# Patient Record
(demographics unavailable — no encounter records)

---

## 2020-03-17 NOTE — RAD
Examination:

1. Bilateral digital diagnostic mammogram.

2. Limited breast ultrasound.



INDICATION: 44-year-old woman with palpable lump in the left breast. She is

due for screening. Does not recall where her last screening mammogram was.



COMPARISON: None currently available.



TECHNIQUE: CC and MLO views of both breasts were obtained with 2-D technique

and reviewed with computer-aided detection. Thereafter, targeted ultrasound of

the area of patient reported palpable concern was performed.



FINDINGS:



The breasts are extremely dense. This can limit the sensitivity of

mammography. There is a nodular parenchymal pattern compatible with benign

fibrocystic change. No definite correlate to the area of reported palpable

concern is identified at mammography. No dominant mass, suspicious

microcalcifications or architectural distortion is identified. Right breast is

otherwise mammographically unremarkable. Left breast was pursued for

additional imaging with targeted ultrasound.



Ultrasound left breast identifies a few small clusters of cysts, such as an 8

mm wide cluster of cysts at the left 12:00 position 5 cm from the nipple that

likely is a benign incidental finding. No sonographic correlate to the area of

palpable concern was observed. The breast parenchyma is largely composed of

dense fibrous tissue.



IMPRESSION:

Benign findings on targeted left breast ultrasound and bilateral mammogram.

No mammographic evidence of malignancy and no sonographic or mammographic

correlate to the area of palpable concern. Recommend clinical management of

this area, including biopsy if there are any clinically suspicious findings. A

surgical consult could certainly be pursued based on the clinical findings

alone if deemed clinically appropriate by the patient's referring provider.



In the absence of any clinical suspicious findings, routine screening in 1

year is recommended.



BI-RADS Category 2

Benign findings



Patient will be entered into a reminder system with target due date for next

mammogram.







BI-RADS 2 -- benign findings